# Patient Record
Sex: FEMALE | URBAN - METROPOLITAN AREA
[De-identification: names, ages, dates, MRNs, and addresses within clinical notes are randomized per-mention and may not be internally consistent; named-entity substitution may affect disease eponyms.]

---

## 2023-03-25 ENCOUNTER — NURSE TRIAGE (OUTPATIENT)
Dept: ADMINISTRATIVE | Facility: CLINIC | Age: 23
End: 2023-03-25

## 2023-03-26 NOTE — TELEPHONE ENCOUNTER
Reason for Disposition   General information question, no triage required and triager able to answer question    Additional Information   Negative: [1] Caller is not with the adult (patient) AND [2] reporting urgent symptoms   Negative: Lab result questions   Negative: Medication questions   Negative: Caller can't be reached by phone   Negative: Caller has already spoken to PCP or another triager   Negative: RN needs further essential information from caller in order to complete triage   Negative: Requesting regular office appointment   Negative: [1] Caller requesting NON-URGENT health information AND [2] PCP's office is the best resource   Negative: Health Information question, no triage required and triager able to answer question    Protocols used: Information Only Call - No Triage-A-  Pt calling from [place]. Pt notified we are not licensed in that state. Pt notified that if pt feels she is having a medical emergency rec to go to local ER. If pt feels she is not having a medical emergency rec to contact family